# Patient Record
Sex: MALE | Race: BLACK OR AFRICAN AMERICAN | NOT HISPANIC OR LATINO | URBAN - METROPOLITAN AREA
[De-identification: names, ages, dates, MRNs, and addresses within clinical notes are randomized per-mention and may not be internally consistent; named-entity substitution may affect disease eponyms.]

---

## 2025-07-18 ENCOUNTER — HOSPITAL ENCOUNTER (EMERGENCY)
Facility: HOSPITAL | Age: 25
Discharge: HOME | End: 2025-07-18

## 2025-07-18 VITALS
HEART RATE: 90 BPM | TEMPERATURE: 97.6 F | DIASTOLIC BLOOD PRESSURE: 69 MMHG | SYSTOLIC BLOOD PRESSURE: 133 MMHG | BODY MASS INDEX: 28.63 KG/M2 | RESPIRATION RATE: 18 BRPM | HEIGHT: 70 IN | WEIGHT: 200 LBS | OXYGEN SATURATION: 98 %

## 2025-07-18 DIAGNOSIS — J02.9 VIRAL PHARYNGITIS: Primary | ICD-10-CM

## 2025-07-18 LAB
FLUAV RNA RESP QL NAA+PROBE: NOT DETECTED
FLUBV RNA RESP QL NAA+PROBE: NOT DETECTED
RSV RNA RESP QL NAA+PROBE: NOT DETECTED
S PYO DNA THROAT QL NAA+PROBE: NOT DETECTED
SARS-COV-2 RNA RESP QL NAA+PROBE: NOT DETECTED

## 2025-07-18 PROCEDURE — 99283 EMERGENCY DEPT VISIT LOW MDM: CPT

## 2025-07-18 PROCEDURE — 2500000001 HC RX 250 WO HCPCS SELF ADMINISTERED DRUGS (ALT 637 FOR MEDICARE OP): Performed by: PHYSICIAN ASSISTANT

## 2025-07-18 PROCEDURE — 87637 SARSCOV2&INF A&B&RSV AMP PRB: CPT | Performed by: PHYSICIAN ASSISTANT

## 2025-07-18 PROCEDURE — 87651 STREP A DNA AMP PROBE: CPT | Performed by: PHYSICIAN ASSISTANT

## 2025-07-18 RX ORDER — IBUPROFEN 600 MG/1
600 TABLET, FILM COATED ORAL ONCE
Status: COMPLETED | OUTPATIENT
Start: 2025-07-18 | End: 2025-07-18

## 2025-07-18 RX ORDER — ACETAMINOPHEN 325 MG/1
975 TABLET ORAL ONCE
Status: COMPLETED | OUTPATIENT
Start: 2025-07-18 | End: 2025-07-18

## 2025-07-18 RX ADMIN — ACETAMINOPHEN 975 MG: 325 TABLET ORAL at 14:51

## 2025-07-18 RX ADMIN — IBUPROFEN 600 MG: 600 TABLET ORAL at 14:51

## 2025-07-18 ASSESSMENT — PAIN - FUNCTIONAL ASSESSMENT: PAIN_FUNCTIONAL_ASSESSMENT: 0-10

## 2025-07-18 ASSESSMENT — PAIN DESCRIPTION - PROGRESSION: CLINICAL_PROGRESSION: NOT CHANGED

## 2025-07-18 ASSESSMENT — PAIN SCALES - GENERAL: PAINLEVEL_OUTOF10: 0 - NO PAIN

## 2025-07-18 NOTE — ED PROVIDER NOTES
"Emergency Department Encounter  [facility] EMERGENCY MEDICINE    Patient: Crow Berrios  MRN: [id number]  : 2000  Date of Evaluation: 2025  ED Provider: Magnolia Hart PA-C        History of Present Illness     This is a 25-year-old healthy male who presents to the ED with 2 to 3 days of sore throat.  He states the pain is worse with swallowing and with speaking.  Describes a sharp pain.  Denies any voice changes.  Endorses minimal dry cough that is nonbloody without any sputum production.  Endorses some mild rhinorrhea without associated congestion.  Denies any ear pain or hearing changes.  Denies any fevers or chills.  Denies nausea, vomiting, diarrhea.  Denies abdominal pain.  Denies known sick contacts.  Denies generalized malaise or bodyaches.  Denies chest pain, shortness of breath or palpitations.  When asked about hemoptysis due to the triage note patient states that he does not have a bloody cough and occasionally will get bleeding of his gums when he brushes them but denies any other bleeding.      History provided by:  Patient   used: No             Visit Vitals  /69 (BP Location: Left arm, Patient Position: Sitting)   Pulse 90   Temp 36.4 °C (97.6 °F) (Temporal)   Resp 18   Ht 1.778 m (5' 10\")   Wt 90.7 kg (200 lb)   SpO2 98%   BMI 28.70 kg/m²   BSA 2.12 m²          Physical Exam       Triage vitals:  T 36.4 °C (97.6 °F)  HR 90  /69  RR 18  O2 98 % None (Room air)    Physical Exam     Physical exam:   General: Vitals noted, no distress. Afebrile.   EENT:  Hearing grossly intact. Normal phonation. MMM. Airway patient. PERRL. EOMI. posterior oropharynx mildly erythematous without exudates or edema.  Uvula midline.  Normal phonation.  Tolerating own secretions.  No visualized peritonsillar abscess.  No Nuno angina.  Neck: No midline tenderness or paraspinal tenderness. FROM.  No tender cervical lymphadenopathy.  Cardiac: Regular, rate, rhythm. " Normal S1 and S2.  No murmurs, gallops, rubs.   Pulmonary: Good air exchange. Lungs clear bilaterally. No wheezes, rhonchi, rales. No accessory muscle use.   Abdomen: Soft, nonsurgical. Nontender. No peritoneal signs.   Back: No CVA tenderness. No midline tenderness or paraspinal tenderness. No obvious deformity or step off.   Extremities: No peripheral edema.  Full range of motion. Moves all extremities freely. No tenderness throughout extremities.   Skin: No rash. Warm and Dry.   Neuro: No focal neurologic deficits. CN 2-12 grossly intact. Sensation equal bilaterally. No weakness.       Results       Labs Reviewed   GROUP A STREPTOCOCCUS, PCR - Normal       Result Value    Group A Strep PCR Not Detected     SARS-COV-2, INFLUENZA A/B AND RSV PCR - Normal    Coronavirus 2019, PCR Not Detected      Flu A Result Not Detected      Flu B Result Not Detected      RSV PCR Not Detected      Narrative:     This assay is an FDA-cleared, in vitro diagnostic nucleic acid amplification test for the qualitative detection and differentiation of SARS CoV-2/ Influenza A/B/ RSV from nasopharyngeal specimens collected from individuals with signs and symptoms of respiratory tract infections, and has been validated for use at [facility]. Negative results do not preclude COVID-19/ Influenza A/B/ RSV infections and should not be used as the sole basis for diagnosis, treatment, or other management decisions. Testing for SARS CoV-2 is recommended only for patients who meet current clinical and/or epidemiological criteria defined by federal, state, or local public health directives.       No orders to display         Medical Decision Making & ED Course         ED Course & MDM     Medical Decision Making  This is a 25-year-old healthy male who presents to the ED with a sore throat as well as mild rhinorrhea for the past 2 to 3 days.  Vital stable upon arrival to the ED.  On examination lungs clear bilaterally.  No  audible cardiac murmurs.  Posterior pharynx is mildly erythematous without exudates or edema.  Uvula midline.  No visualized peritonsillar abscess.  Normal phonation, tolerating own secretions.  Based on patient's description of his symptoms and his examination low suspicion for retropharyngeal abscess at this time.  No Nuno angina.  Viral swab as well as strep swab obtained.  Patient medicated with Motrin and Tylenol for his pain today.  Viral swab negative.  Strep swab also negative.  These results were discussed with the patient.  He was advised to take over-the-counter cold and flu medications as needed for his pain.  I discussed with the patient that his symptoms are likely due to a viral pharyngitis and should resolve on their own after a few days.  I did recommend follow-up with his primary care provider if his symptoms persist.  He was given signs and symptoms to return to the ED with.  He had no further questions at this time and he was discharged from the ED in stable condition.         Diagnoses as of 07/18/25 1608   Viral pharyngitis         Disposition   As a result of the work-up, the patient was discharged home.  he was informed of his diagnosis and instructed to come back with any concerns or worsening of condition.  he and was agreeable to the plan as discussed above.  he was given the opportunity to ask questions.  All of the patient's questions were answered.    Procedures     Patient was seen independently    Procedures    Magnolia Hart PA-C  Emergency Medicine      Magnolia Hart PA-C  07/18/25 2314

## 2025-07-18 NOTE — Clinical Note
Crow Berrios was seen and treated in our emergency department on 7/18/2025.  He may return to work on 07/19/2025.       If you have any questions or concerns, please don't hesitate to call.      Magnolia Hart PA-C

## 2025-07-18 NOTE — ED TRIAGE NOTES
Sore throat x3 days, specs of blood and muccous noted with cough, Reporting fever, waking up at night with sweating. Denies chest pain or SOB. Reports air conditioner is at it's coldest and had fan blowing on face.